# Patient Record
Sex: MALE | Race: WHITE | NOT HISPANIC OR LATINO | Employment: OTHER | URBAN - METROPOLITAN AREA
[De-identification: names, ages, dates, MRNs, and addresses within clinical notes are randomized per-mention and may not be internally consistent; named-entity substitution may affect disease eponyms.]

---

## 2017-09-13 ENCOUNTER — AMBULATORY - HEALTHEAST (OUTPATIENT)
Dept: NEUROSURGERY | Facility: CLINIC | Age: 72
End: 2017-09-13

## 2017-09-13 DIAGNOSIS — M54.9 BACK PAIN: ICD-10-CM

## 2017-09-21 ENCOUNTER — HOSPITAL ENCOUNTER (OUTPATIENT)
Dept: RADIOLOGY | Facility: CLINIC | Age: 72
Discharge: HOME | End: 2017-09-21
Attending: NEUROLOGICAL SURGERY

## 2017-09-21 ENCOUNTER — HOSPITAL ENCOUNTER (OUTPATIENT)
Dept: MRI IMAGING | Facility: CLINIC | Age: 72
Discharge: HOME | End: 2017-09-21
Attending: NEUROLOGICAL SURGERY

## 2017-09-21 DIAGNOSIS — M54.9 BACK PAIN: ICD-10-CM

## 2017-10-03 ENCOUNTER — RECORDS - HEALTHEAST (OUTPATIENT)
Dept: ADMINISTRATIVE | Facility: OTHER | Age: 72
End: 2017-10-03

## 2017-10-04 ENCOUNTER — OFFICE VISIT - HEALTHEAST (OUTPATIENT)
Dept: NEUROSURGERY | Facility: CLINIC | Age: 72
End: 2017-10-04

## 2017-10-04 DIAGNOSIS — I73.9 CLAUDICATION (H): ICD-10-CM

## 2017-10-04 DIAGNOSIS — G95.9 CERVICAL MYELOPATHY (H): ICD-10-CM

## 2017-10-04 DIAGNOSIS — M60.08 ABSCESS OF PARASPINOUS MUSCLES: ICD-10-CM

## 2017-10-04 RX ORDER — ATORVASTATIN CALCIUM 40 MG/1
40 TABLET, FILM COATED ORAL
Status: SHIPPED | COMMUNITY
Start: 2016-10-25

## 2017-10-04 RX ORDER — NITROGLYCERIN 0.4 MG/1
0.4 TABLET SUBLINGUAL
Status: SHIPPED | COMMUNITY
Start: 2017-08-22

## 2017-10-04 RX ORDER — RAMIPRIL 10 MG/1
20 CAPSULE ORAL
Status: SHIPPED | COMMUNITY
Start: 2017-08-09

## 2017-10-04 RX ORDER — FERROUS SULFATE 325(65) MG
325 TABLET ORAL
Status: SHIPPED | COMMUNITY
Start: 2015-03-02

## 2017-10-04 RX ORDER — GABAPENTIN 300 MG/1
300 CAPSULE ORAL AT BEDTIME
Status: SHIPPED | COMMUNITY
Start: 2017-08-14

## 2017-10-04 RX ORDER — METOPROLOL SUCCINATE 25 MG/1
12.5 TABLET, EXTENDED RELEASE ORAL
Status: SHIPPED | COMMUNITY
Start: 2017-09-19

## 2017-10-04 RX ORDER — METRONIDAZOLE 7.5 MG/G
GEL TOPICAL
Status: SHIPPED | COMMUNITY
Start: 2017-05-04

## 2017-10-04 ASSESSMENT — MIFFLIN-ST. JEOR: SCORE: 1890.19

## 2017-10-12 ENCOUNTER — AMBULATORY - HEALTHEAST (OUTPATIENT)
Dept: NEUROSURGERY | Facility: CLINIC | Age: 72
End: 2017-10-12

## 2017-10-17 ENCOUNTER — HOSPITAL ENCOUNTER (OUTPATIENT)
Dept: CT IMAGING | Facility: CLINIC | Age: 72
Discharge: HOME | End: 2017-10-17
Attending: NEUROLOGICAL SURGERY

## 2017-10-17 ENCOUNTER — HOSPITAL ENCOUNTER (OUTPATIENT)
Dept: MRI IMAGING | Facility: CLINIC | Age: 72
Discharge: HOME | End: 2017-10-17
Attending: NEUROLOGICAL SURGERY

## 2017-10-17 DIAGNOSIS — G95.9 CERVICAL MYELOPATHY (H): ICD-10-CM

## 2017-10-17 DIAGNOSIS — I73.9 CLAUDICATION (H): ICD-10-CM

## 2017-10-26 ENCOUNTER — COMMUNICATION - HEALTHEAST (OUTPATIENT)
Dept: NEUROSURGERY | Facility: CLINIC | Age: 72
End: 2017-10-26

## 2019-09-12 ENCOUNTER — COMMUNICATION - HEALTHEAST (OUTPATIENT)
Dept: TELEHEALTH | Facility: CLINIC | Age: 74
End: 2019-09-12

## 2019-09-16 ENCOUNTER — RECORDS - HEALTHEAST (OUTPATIENT)
Dept: ADMINISTRATIVE | Facility: OTHER | Age: 74
End: 2019-09-16

## 2019-10-03 ENCOUNTER — RECORDS - HEALTHEAST (OUTPATIENT)
Dept: ADMINISTRATIVE | Facility: OTHER | Age: 74
End: 2019-10-03

## 2019-10-03 ENCOUNTER — AMBULATORY - HEALTHEAST (OUTPATIENT)
Dept: CARDIOLOGY | Facility: CLINIC | Age: 74
End: 2019-10-03

## 2019-10-04 ENCOUNTER — AMBULATORY - HEALTHEAST (OUTPATIENT)
Dept: CARDIOLOGY | Facility: CLINIC | Age: 74
End: 2019-10-04

## 2019-10-04 ENCOUNTER — OFFICE VISIT - HEALTHEAST (OUTPATIENT)
Dept: CARDIOLOGY | Facility: CLINIC | Age: 74
End: 2019-10-04

## 2019-10-04 DIAGNOSIS — I25.83 CORONARY ARTERY DISEASE DUE TO LIPID RICH PLAQUE: ICD-10-CM

## 2019-10-04 DIAGNOSIS — E78.5 DYSLIPIDEMIA, GOAL LDL BELOW 70: ICD-10-CM

## 2019-10-04 DIAGNOSIS — Z95.0 BIVENTRICULAR CARDIAC PACEMAKER IN SITU: ICD-10-CM

## 2019-10-04 DIAGNOSIS — I25.10 CORONARY ARTERY DISEASE DUE TO LIPID RICH PLAQUE: ICD-10-CM

## 2019-10-04 LAB
HCC DEVICE COMMENTS: NORMAL
HCC DEVICE IMPLANTING PROVIDER: NORMAL
HCC DEVICE MANUFACTURE: NORMAL
HCC DEVICE MODEL: NORMAL
HCC DEVICE SERIAL NUMBER: NORMAL
HCC DEVICE TYPE: NORMAL

## 2019-10-04 RX ORDER — ASPIRIN 325 MG
325 TABLET ORAL DAILY
Status: SHIPPED | COMMUNITY
Start: 2019-10-04

## 2019-10-04 RX ORDER — RIBOFLAVIN (VITAMIN B2) 100 MG
1 TABLET ORAL DAILY
Status: SHIPPED | COMMUNITY
Start: 2019-10-04

## 2019-10-04 RX ORDER — SILDENAFIL 100 MG/1
100 TABLET, FILM COATED ORAL DAILY PRN
Status: SHIPPED | COMMUNITY
Start: 2019-01-25

## 2019-10-04 RX ORDER — HYDROCHLOROTHIAZIDE 25 MG/1
25 TABLET ORAL DAILY
Status: SHIPPED | COMMUNITY
Start: 2010-04-28

## 2019-10-04 RX ORDER — FUROSEMIDE 20 MG
20 TABLET ORAL EVERY OTHER DAY
Status: SHIPPED | COMMUNITY
Start: 2019-01-17

## 2019-10-04 ASSESSMENT — MIFFLIN-ST. JEOR: SCORE: 1903.8

## 2021-05-31 VITALS — HEIGHT: 73 IN | WEIGHT: 245 LBS | BODY MASS INDEX: 32.47 KG/M2

## 2021-06-02 ENCOUNTER — RECORDS - HEALTHEAST (OUTPATIENT)
Dept: ADMINISTRATIVE | Facility: CLINIC | Age: 76
End: 2021-06-02

## 2021-06-02 NOTE — PATIENT INSTRUCTIONS - HE
"Pineda,    It was a pleasure to see you today at the [facility].     I recommend seeing a primary care physician at the [facility] in [place].    You will see Dr. To in one year after a new lipid profile.     Please call us if you have any questions or concerns about your heart.      Carlos To M.D.    "

## 2021-06-02 NOTE — PROGRESS NOTES
In clinic device check with Device RN and Dr. To.  Please see link for full device report.  Patient was informed of results and next follow up during today's visit.

## 2021-06-03 VITALS
DIASTOLIC BLOOD PRESSURE: 54 MMHG | SYSTOLIC BLOOD PRESSURE: 128 MMHG | HEART RATE: 76 BPM | WEIGHT: 248 LBS | HEIGHT: 73 IN | BODY MASS INDEX: 32.87 KG/M2 | RESPIRATION RATE: 16 BRPM

## 2021-06-12 NOTE — PROGRESS NOTES
A consult was placed to Dr. Franklin.  The reason for the consult was back pain.   A MRI wo contrast of the lumbar spine was requested to assess for abnormality.  Leyla Joaquin RN, CNRN

## 2021-06-13 NOTE — PROGRESS NOTES
Neurosurgery consultation was requested by: Dr. Olson for evaluation of lumbar stenosis with neurogenic claudication  Pain: presents in the low back over the last year - intermittent positional ache  Radicular Pain is present: in the left shin and bilateral hips with walking  Lhermitte sign: denies  Motor complaints: some weakness on the left  Sensory complaints: denies  Gait and balance issues: shopping cart sign  Bowel or bladder issues: denies incontinence or saddle anesthesia  Duration of SX is: chronic  The symptoms are worse with: standing and walking  The symptoms are better with: bending over   Injury: denies  Severity is: moderate  Patient has tried the following conservative measures: PT in the past  CECE score is: 28%  Leyla Joaquin RN, CNRN

## 2021-06-13 NOTE — PROGRESS NOTES
NEUROSURGERY CONSULTATION NOTE    10/4/2017     Pineda Wong is a 72 y.o. male who is sent to us in consultation by Dr. Olson    for evaluation of his claudication.       The pt describes the symptoms as having  started couple years ago.  Pt describes claudication with pain in his hips bilaterally points to area in his back lateral to SI joints and below iliac crest which is relieved with stopping and triggered with walking more than 20 feet.    Not triggered with standing -- can stand for an hour..    Not really pain, muscles don't want to move.    This is better with pushing a shopping cart.       Uses a pedal machine at home which does not trigger the hip pain when he has been pedaling an hour.  If however   he has been on it an hour he triggers the pain once he is off   And then 10-15 minutes later pain starts.   Takes an hour  .           Pt was previously  treated with  PT for LBP (3 years ago) and he doesn't have LBP.    No numbness in his legs, feet numb (peripheral neuropathy).     No dental work,  No infections, no diabetes.        HPI:  CABG 12-14 years ago with ankle swelling on left leg where they took the graft.     Rheumatolist in the past    No past medical history on file.  No past surgical history on file.      REVIEW OF SYSTEMS:  ROS reviewed with pt as documented on pt health form of 10/4/2017.    Negative cardiac, pulmonary, hematological    .  No family hx of anesthetic reactions   .  No family hx of hypercoagulability .       MEDICATIONS:  No current outpatient prescriptions on file.     No current facility-administered medications for this visit.          ALLERGIES/SENSITIVITIES:     Allergies not on file    PERTINENT SOCIAL HISTORY: pipe smoker 35 yrs ago.  Social History     Social History     Marital status:      Spouse name: N/A     Number of children: N/A     Years of education: N/A     Social History Main Topics     Smoking status: Not on file     Smokeless tobacco: Not on file      Alcohol use Not on file     Drug use: Not on file     Sexual activity: Not on file     Other Topics Concern     Not on file     Social History Narrative         FAMILY HISTORY:  No family history on file.     PHYSICAL EXAM:   Constitutional: There were no vitals taken for this visit.     Mental Status: A & O in no acute distress.  Affect is appropriate.  Speech is fluent.  Recent and remote memory are intact.  Attention span and concentration are normal.     Cranial Nerves: CN1: grossly intact per patient recall. CN2: No funduscopic exam performed. CN3,4 & 6: Pupillary light response, lateral and vertical gaze normal.  No nystagmus.  Visual fields are full to confrontation. CN5: Intact to touch CN7: No facial weakness, smile, facial symmetry intact. CN8: Intact to spoken voice. CN9&10: Gag reflex, uvula midline, palate rises with phonation. CN11: Shoulder shrug 5/5 intact bilaterally. CN12: Tongue midline and moves freely from side to side.     Motor: No pronator drift of upper extremity. Normal bulk and tone all muscle groups of upper and lower extremities.  Decreased Right finger abduction and decreased left hip flexion      Sensory: Sensation intact bilaterally to light touch.  Decreased vibratory sensation LE compared to UE      Coordination:  Heel/toe/  gait intact.  Can't   tandem gait      Reflexes; supinator, biceps, triceps, knee/ ankle jerk very brisk . No  hoffmans/ no     babinski/ clonus.    Pulses palp on R not palp on left.    Swelling about the size of a 1/2  lemon on left foot without trill     Can't lay on back     IMAGING: I personally reviewed all radiographic images    MRI  Anterior ankylosis multilevel. Less ankylosis at anterior L23;  Significant edema in the bone, disk and paraspinous musculature        Flex/ext  No instability considerable ankylosis.      CONSULTATION ASSESSMENT AND PLAN:   Not sure why he has claudication.   There is no central stenosis apparent on the MRI.  We could do  vascular assessment Since he has pulse on the R an  ABIs would probably not yield   much information.  We will do   a CTA of his aorta and runoff looking for a high proximal lesion, (symptoms are in the hips).       He needs a ESR and CRP for a baseline to evaluate the edema in the bone/disk/paraspinous muscle at L23.  If negative, then this is probably degenerative changes from the ankylosis of L3-S1 putting significant mechanical stress on the level above (L23).  If this is the case, pain   might be lessened by intermittent use of a brace while he is finishing up fusing his own L23.    The other thing on his MRI, ankylosing spondylosis, should be evaluated by a rheumatologist to make sure that there is no associated abnormality that needs evaluation.  I will send him back to [place] for this ([place]) and I referred him to Dr. Lynne.    He has abnormal hyperreflexia in his legs and arms and little  vibratory sensation in his feet.   As long as there is no cervical stenosis (we will order an MRI of the cervical spine) this will need evaluation by neurology and maybe Dr. Olson to make sure there is no metabolic explanation (thyroid etc).                 I spent more than 45 minutes in this apt, examining the pt, reviewing the scans, reviewing notes from chart, discussing treatment options with risks and benefits and coordinating care. >50 % clinic time was spent in face to face counseling and coordinating care    Johanna Franklin       Cc:   Derrick Olson MD  [address] [facility] [address]

## 2021-06-28 NOTE — PROGRESS NOTES
Progress Notes by Kermit oT MD at 10/4/2019 10:30 AM     Author: Kermit To MD Service: -- Author Type: Physician    Filed: 10/4/2019 11:48 AM Encounter Date: 10/4/2019 Status: Signed    : Kermit To MD (Physician)           Click to link to [facility] Consultation Note    Pineda Wong came to meet with me in consultation today at the [facility] to transfer his cardiovascular care from the [facility].  Pineda met me earlier this year when he came to a visit with his brother, Vlad, who is also under my care.    Assessment:    1. Coronary artery disease due to lipid rich plaque     2. Dyslipidemia, goal LDL below 70  Lipid Profile       Plan:    1. Continue current cardiovascular medical therapy.  2. Pineda has already transferred his pacemaker care to our device clinic.  3. He is considering seeing a new primary care physician at the [facility] in [place], as his previous primary physician, Dr. Olson, has retired.  4. Return to see Dr. To in 1 year after a new lipid profile.    An After Visit Summary was printed and given to the patient.    Current History:    I met with Pineda in consultation today at his request to take over the responsibility of managing his cardiovascular disease.  We reviewed his medical history in detail.    He was diagnosed with coronary disease in 2002 when he had stenting of the proximal left anterior descending performed by my former colleague, Dr. Scott Melgoaz, at [facility].  Several years later his coronary disease had progressed and he had multivessel coronary bypass grafting performed at that same institution.  He had an abnormal pharmacologic stress test in March of this year at [facility] showing a small hemic defect but was sent for angiography in spite of this low risk finding.  The angiographic report, which is copied below, showed patency of all of his  bypass grafts and Dr. Ankit Whitman felt there were no cardiovascular explanation for the symptoms or stress test finding.    Pineda's system review is notable for chronic heartburn, joint discomfort, fatigue and dizziness.  He does not describe angina pectoris, unusual shortness of breath, orthopnea or palpitations.  A single episode of syncope in January and was found to have AV block and a permanent pacemaker was implanted at that time by Dr. Juan José Rocha at [facility].    Patient Active Problem List   Diagnosis   ? Biventricular cardiac pacemaker in situ   ? Essential hypertension   ? Dyslipidemia, goal LDL below 70   ? Coronary artery disease due to lipid rich plaque   ? CKD (chronic kidney disease) stage 3, GFR 30-59 ml/min (H)       Past Medical History:  Past Medical History:   Diagnosis Date   ? Chronic bilateral low back pain with left-sided sciatica 08/15/2016    per Dr. Rizvi of Neurosurgery and Neurology not likely radiculopathy; MRI 2014 and 2013 not showing significant stenoses but showing considerable degenerative change   ? CKD (chronic kidney disease) stage 3, GFR 30-59 ml/min (H) 8/16/2016   ? Coronary artery disease due to lipid rich plaque 2002   ? Dyslipidemia, goal LDL below 70 2002   ? Essential hypertension    ? GERD (gastroesophageal reflux disease)    ? Heart block atrioventricular 01/14/2019   ? Hereditary and idiopathic peripheral neuropathy 12/11/2014   ? PVCs (premature ventricular contractions) 3/25/2015   ? Rosacea 5/8/2017   ? Syncope 01/2019       Past Surgical History:  Past Surgical History:   Procedure Laterality Date   ? A-V CARDIAC PACEMAKER INSERTION  01/14/2019    Dr. Juan José Rocha at [facility]   ? CORONARY ARTERY BYPASS GRAFT  2007    at [facility]; bypasses all patent per angio done by Dr. Ankit Whitman in April, 2019 at [facility]   ? CORONARY STENT PLACEMENT  03/06/2002    prox LAD by Dr. Scott Melgoza at [facility]       Family History:  Family History   Problem  "Relation Age of Onset   ? Other Mother         \"deteriorated\"   ? Kidney failure Father 85        no dialysis, cause of death   ? No Medical Problems Sister    ? Coronary artery disease Brother 78   ? CABG Brother 78   ? No Medical Problems Sister    ? No Medical Problems Daughter    ? Pacemaker Son 53   ? No Medical Problems Son        Social History:   reports that he quit smoking about 20 years ago. His smoking use included pipe. He smoked 0.00 packs per day. He has never used smokeless tobacco. He reports current alcohol use of about 1.0 standard drinks of alcohol per week. He reports that he does not use drugs.    Medications:  Outpatient Encounter Medications as of 10/4/2019   Medication Sig Dispense Refill   ? aspirin 325 MG tablet Take 325 mg by mouth daily.     ? atorvastatin (LIPITOR) 40 MG tablet Take 40 mg by mouth.     ? cholecalciferol, vitamin D3, 1,000 unit (25 mcg) tablet Take 1,000 Units by mouth 2 (two) times a day.     ? DOCOSAHEXANOIC ACID ORAL Take 2 capsules by mouth 2 (two) times a day.     ? ferrous sulfate 325 (65 FE) MG tablet Take 325 mg by mouth.     ? furosemide (LASIX) 20 MG tablet Take 20 mg by mouth every other day.     ? gabapentin (NEURONTIN) 300 MG capsule Take 300 mg by mouth at bedtime.            ? glucosamine-chondroitin 500-400 mg tablet Take 1 tablet by mouth daily.     ? hydroCHLOROthiazide (HYDRODIURIL) 25 MG tablet Take 25 mg by mouth daily.     ? metoprolol succinate (TOPROL-XL) 25 MG Take 12.5 mg by mouth.     ? metroNIDAZOLE (METROGEL) 0.75 % gel Apply to affected area of face twice daily for rosacea     ? nitroglycerin (NITROSTAT) 0.4 MG SL tablet Place 0.4 mg under the tongue.     ? ramipril (ALTACE) 10 MG capsule Take 20 mg by mouth.     ? sildenafil (VIAGRA) 100 MG tablet Take 100 mg by mouth daily as needed.     ? tablet cutter Misc See comments     ? thiamine HCl, vitamin B1, 250 MG tablet Take 250 mg by mouth.     ? vitamin A 42851 UNIT capsule Take 10,000 Units " "by mouth.       No facility-administered encounter medications on file as of 10/4/2019.        Allergies:  Patient has no known allergies.    Review of Systems:     General: WNL  Eyes: WNL  Ears/Nose/Throat: WNL  Lungs: WNL  Heart: WNL  Stomach: Heartburn  Bladder: WNL  Muscle/Joints: Joint Pain  Skin: WNL  Nervous System: Daytime Sleepiness, Dizziness  Mental Health: WNL     Blood: WNL    Objective:     Physical Exam:  Wt Readings from Last 5 Encounters:   10/04/19 (!) 248 lb (112.5 kg)   10/04/17 (!) 245 lb (111.1 kg)      6' 1\" (1.854 m)  Body mass index is 32.72 kg/m .  /54 (Patient Site: Left Arm, Patient Position: Sitting, Cuff Size: Adult Large)   Pulse 76   Resp 16   Ht 6' 1\" (1.854 m)   Wt (!) 248 lb (112.5 kg)   BMI 32.72 kg/m      General Appearance: Alert and not in distress   HEENT: No scleral icterus; the tongue is midline and the mucous membranes are pink and moist   Neck: No cervical bruits, adenopathy, or thyromegaly; jugular venous pressure is difficult to evaluate due to obesity   Chest: The spine is straight and the chest is symmetric   Lungs: Respirations are unlabored; the lungs are clear to auscultation   Cardiovasular: Auscultation reveals normal first and second heart sounds and no murmurs, rubs, or gallops; the carotid, radial, femoral, and posterior tibial pulses are intact   Abdomen: No organomegaly, masses, bruits, or tenderness; bowel sounds are present   Extremities: No cyanosis, clubbing or edema; he has a large soft cyst of the left ankle   Skin: No xanthelasma   Neurologic: Mood and affect are appropriate       Cardiac testing:    Echocardiogram:  [facility]   [address] [place] [address] [address], [place] [address]   Main: [phone] [email or web address]                                                 Transthoracic Echo Report   JACK MELCHOR   Luciemarvin ID: [id number] Age: 73 : 1945 Ordering Provider: BRIAN GREEN   Exam Date: 2018 " 10:10 Gender: M Sonographer: KIP   Accession #: [id number] Height: 68.9 in BSA: 2.27 m  BP: 150 / 70   Weight: 251 lbs BMI: 37.2 kg/m  HR: 55     Location: [facility]  - Outpatient Rhythm: Sinus Rhythm   Procedure Components: 2D imaging with contrast, Color Doppler, Spectral Doppler   Indications: PVCs (premature ventricular contractions); Bradycardia   Technical Quality: Fair, Technically difficult study Contrast: Lumason     Final Conclusion Previous Study: 2/10/2015     1.  Normal LV size and systolic function.   2.  No regional wall motion abnormalities.   3.  Upper normal right ventricular size and normal RV systolic function.   4.  No significant valvular heart disease.   5.  Estimated right ventricular systolic pressure is mildly increased at 31 mm Hg plus RA   pressure.   No major change as compared to the echo from 2/15.   Estimated EF: 60%    [facility]  Cardiac Catheterization Report    Name:  JACK MELCHOR Event Date: 4/22/2019 11:22   Excellian ID #: [id number]    Encounter #: [id number]  Accession #: [id number]  RENU #: [id number] Diagnostic Physician: JHON PIERCE  [facility]  Primary Cardiologist: BRIAN GREEN  Referring Physician:  Primary Care Physician: APRIL LARIOS YOB: 1945  Gender: Male  Age: 74     Summary/Conclusions  PRESENTATION / INDICATIONS  * symptoms of fatigue, lightheadedness and positive gxt  VASCULAR ACCESS  * Using ultrasound guidance and a percutaneous technique, the right common femoral artery was accessed. Ultrasound was used to confirm vessel patency, localizing needle into the lumen of the vessel. An image was saved for the medical record.  * Using ultrasound guidance and a percutaneous technique, the right femoral vein was accessed. Ultrasound was used to confirm vessel patency, localizing needle into the lumen of the vessel. An image was saved for the medical record.  DIAGNOSTIC - CORONARY  *  "Right dominant coronary artery system  * The left main artery was normal in appearance and free of obstructive disease.  * Chronic total occlusion of the proximal LAD  * Chronic total occlusion of the proximal 1st Diagonal  * Chronic total occlusion of the proximal 1st Obtuse Marginal  * The RCA has mild diffuse disease and a moderate distal lesion (50%)  DIAGNOSTIC - GRAFTS  * The SVG to the 1st diagonal is patent.  * The SVG to the 1st obtuse marginal is patent.  * There is a graft that branches from the proximal portion of the diagonal graft and then to the LAD.  It is widley patent. (\"y\" graft).  The native LIMA is occluded and appears to be anastomosed to a small non-cor vessel.  HEMODYNAMICS  * The LVEDP is within normal limits (13mmgh)  * Mildly elevated pulmonary capillary wedge pressures (18mmgh)  * Mild pulmonary artery hypertension.(38/18 with mean of 25)    RECOMMENDATIONS & PLAN  * Uncertain etiology of pts symptoms.  pt will follow with cardiology for continued evaluation.     Consent & Universal Protocol  The risks, benefits, and alternatives of the procedure were discussed with the patient and written informed consent was obtained.  [place] protocol was followed.  TIME OUT conducted just prior to starting procedure confirmed patient identity, site/side, procedure, patient position, and availability of correct equipment and implants (if applicable).    Staff  Name Title     JHON PIERCE Diagnostic Cardiologist        Imaging:    CTA ABDOMINAL AORTA ILIOFEMORAL RUNOFF  10/17/2017 4:52 PM     INDICATION: Left lower extremity claudication. Suspect vascular etiology.  TECHNIQUE: Helical acquisition through the abdomen, pelvis, and bilateral lower extremities was performed during the arterial phase of contrast enhancement using IV Contrast. 2D and 3D reconstructions were performed by the CT technologist. Dose reduction   techniques were used.  IV CONTRAST: Iohexol (Omni) 100 mL  COMPARISON: " None.     FINDINGS:  AORTA: Mild calcified atherosclerotic changes of a normal caliber abdominal aorta. Normal caliber celiac, superior mesenteric and bilateral renal arteries. Mildly stenotic origin of the inferior mesenteric artery.     RIGHT LEG: Mild calcified atherosclerotic changes of a normal caliber common iliac and internal iliac arteries. Normal caliber external iliac and common femoral arteries. Normal caliber profunda femoris, superficial femoral and popliteal arteries with   mild calcified atherosclerotic changes. Three-vessel runoff.     LEFT LEG: Mild calcified atherosclerotic changes of the internal iliac artery. Normal caliber iliac arteries. Mild calcified endoscopic changes of the common femoral and upper profunda femoris arteries without stenosis. Normal caliber superficial femoral   and popliteal arteries. High origin of the anterior tibial artery. Three-vessel runoff.     LUNG BASES: Partially visualized sternotomy. Mild subsegmental atelectasis, bilateral lung bases.     ABDOMEN AND PELVIS: Arterial phase images of the liver and spleen are within normal limits. No adrenal masses. 4 mm calculus, posterior calyx, interpolar region right kidney. 4 mm calculus, anterior calyx, lower pole right kidney. 5 mm calculus, lower   pole left kidney. Both kidneys are negative for hydronephrosis. Pancreas is within normal limits. Normal caliber loops of small bowel. Residual stool throughout a normal caliber colon. Scattered diverticula within the lower ascending portion of the   colon. Mild prostatic enlargement. Calcified pelvic phleboliths.     MUSCULOSKELETAL: Bridging visualized thoracic and lumbar osteophytes. Severe degenerative narrowing at the L4 disc interspace. Degenerative changes bilateral sacroiliac joints and hips. Left Baker's cyst, measuring 5.3 x 4.1 x 2.3 cm. 5.3 x 3.2 x 5 cm   mildly complex cyst, anterior and superior to the left tibiotalar joint. The extensor digitorum tendon appears  to traversed through this cyst.     IMPRESSION:   CONCLUSION:  1.  Mild calcified atherosclerotic changes of the visualized arteries without significant stenosis.  2.  5.3 x 5 cm anterior left ankle cyst. This may represent a ganglion cyst versus tenosynovitis. Further assessment recommended with MRI.  3.  Small left Baker's cyst.  4.  Nonobstructing bilateral renal calculi.    Lab Review:    LDL was 46 mg/dl on 7/15/2019 at his primary clinic.        Much or all of the text in this note was generated through the use of the Dragon Dictate voice-to-text software. Errors in spelling or words which seem out of context are unintentional. Sound alike errors, in particular, may have escaped editing.

## 2021-08-08 ENCOUNTER — HEALTH MAINTENANCE LETTER (OUTPATIENT)
Age: 76
End: 2021-08-08

## 2021-10-03 ENCOUNTER — HEALTH MAINTENANCE LETTER (OUTPATIENT)
Age: 76
End: 2021-10-03

## 2022-01-31 ENCOUNTER — DOCUMENTATION ONLY (OUTPATIENT)
Dept: CARDIOLOGY | Facility: CLINIC | Age: 77
End: 2022-01-31
Payer: COMMERCIAL

## 2022-01-31 NOTE — PROGRESS NOTES
Is the implanted device safe for MRI Exam?  Yes  Is this device 3T compatible? Yes  Device Type: Pacemaker      Device Information:  Make: Medtronic   Model: [id number] Solara CRT-P    Cardiology Orders for Device Programming     -- Yes -- The patient has a MRI conditional pulse generator and leads from the same     -- Yes -- The pulse generator and leads have been implanted for at least 6 weeks    -- Yes-- The device is implanted in the right or left pectoral region.        -- Yes -- There are not any additional active cardiac devices, abandoned leads, lead extenders or adapters    -- Yes -- The device lead impedance measurements are within the normal range. (Manufacture recommendations: Medtronic Advisa and Revo 200-1,500 ohms; Medtronic ICD and CRT's 200-3000 ohms and defibrillation lead impedance   ohms)    -- Yes -- If the patient is pacemaker dependent the thresholds are less than or equal to 2.0V @ 0.4ms.     Date of last in-office Device check: 9/10/2021  Results of last in-office Device check:  1.   Right atrium impedance: 418 Ohms   2.   Right ventricle impedance: 380 Ohms   3.   Left ventricle(HIS) impedance: 266 Ohms      4.   Right atrium threshold: 0.5 V @ 0.4 ms   5.   Right ventricle threshold: 1 V @ 0.4 ms   6.   Left ventricle threshold: n/a Lead is OFF     Device programming during the scan guidelines   Pacing Mode (check one): DOO  Pacing Rate: 80 bpm or > intrinsic    Per notes HIS lead (LV port) is programmed off since 6/25/2020.   Marsha Mcclure RN          Data below take from Care Everywhere, post MRI check at [facility] on 9/10/21.

## 2022-02-21 ENCOUNTER — HOSPITAL ENCOUNTER (OUTPATIENT)
Dept: RADIOLOGY | Facility: HOSPITAL | Age: 77
End: 2022-02-21
Attending: FAMILY MEDICINE
Payer: MEDICARE

## 2022-02-21 ENCOUNTER — HOSPITAL ENCOUNTER (OUTPATIENT)
Dept: MRI IMAGING | Facility: HOSPITAL | Age: 77
End: 2022-02-21
Attending: FAMILY MEDICINE
Payer: MEDICARE

## 2022-02-21 VITALS — HEART RATE: 79 BPM | OXYGEN SATURATION: 99 %

## 2022-02-21 DIAGNOSIS — M25.361 INSTABILITY OF RIGHT KNEE JOINT: ICD-10-CM

## 2022-02-21 PROCEDURE — 73721 MRI JNT OF LWR EXTRE W/O DYE: CPT | Mod: RT

## 2022-02-21 PROCEDURE — 71046 X-RAY EXAM CHEST 2 VIEWS: CPT

## 2022-02-21 NOTE — PROGRESS NOTES
Pt in MRI now--[employer] rep put pt in DOO mode at 80. Pt A&O--no complaints. HR 78, sats 96% RA.

## 2022-02-21 NOTE — PROGRESS NOTES
Patient's device placed into MRI Surescan mode prior to scan.  Patient monitored by RN via EKG and pulseoximetry throughout procedure.  Patient stable throughout.  Device placed back into previous mode at completion of MRI.    Pt tolerated well.

## 2022-09-10 ENCOUNTER — HEALTH MAINTENANCE LETTER (OUTPATIENT)
Age: 77
End: 2022-09-10

## 2023-10-01 ENCOUNTER — HEALTH MAINTENANCE LETTER (OUTPATIENT)
Age: 78
End: 2023-10-01

## 2024-11-24 ENCOUNTER — HEALTH MAINTENANCE LETTER (OUTPATIENT)
Age: 79
End: 2024-11-24